# Patient Record
(demographics unavailable — no encounter records)

---

## 2025-02-10 NOTE — HISTORY OF PRESENT ILLNESS
[FreeTextEntry8] : 31M w/ PMH of GERD is coming in for follow up after flu.  Was diagnosed with flu on 1/12/24. Slightly improved after 2 weeks, but then got worse. Now fatigued, cough w/ green phlegm. Denies SOB or current fevers.

## 2025-05-27 NOTE — HISTORY OF PRESENT ILLNESS
[de-identified] : Mr. JARROD LOBO 31 year  male  with no PMH   present to the office due to a cough. As per patient for the past 7 days has a cough with green phlegm production with some wheezing.  Denies throat pain, earache,  nasal congestion.  Patient went to an UC 3 days ago, got a rx for a moxifloxacin 400mg for 7 days. Today took the 3rd dose. As per patient feels OK, still has a cough with wheezing. Wants to do a blood test to check for a hep B, C and HIV

## 2025-05-27 NOTE — PHYSICAL EXAM
[TextEntry] : Constitutional: Well appearing, not in acute distress Head: Normocephalic, no lesions Eyes: PERRLA, conjunctiva is NL Ear: Ear canal is normal, tympanic membrane is intact Nose: Nasal turbinates are NL Throat: Clear, no exudates, no lesions Neck: Supple, no masses Chest: Has  a scattered b/l wheezing Heart: Regular rate, no murmurs Abdomen: Soft, no tenderness : No CVAT Neuro: AAO x 3, no focal neurological deficit.

## 2025-05-27 NOTE — PLAN
[FreeTextEntry1] : Mr. JARROD LOBO 31 year  male  with no PMH   present to the office due to a cough. As per patient for the past 7 days has a cough with green phlegm production with some wheezing. Recommend  to take medications as it is prescribed. If symptoms will persist RTC. Rx were issued. Do a cxr.  Blood test to check for a Hep B, C and HIV will be done today(will call patient with the results) RTC for a f/u if symptoms will persist. Patient is verbalized understanding

## 2025-06-12 NOTE — ASSESSMENT
[FreeTextEntry1] : 31M w/ GERD is coming in for cough.  #Cough -Likely lingering cough after infection. Patient already received course of fluoroquinolone as well as Medrol dose pack. Chest Xray negative, lungs clear on today's exam. Patient feels better w/ the exception of cough. -Cough will likely resolve with time, recommend Mucinex OTC, benzonatate as needed. Will hold off on further abx -Instructed patient to let us know if his symptoms worsen or do not resolve after a few more weeks  RTC if no improvement

## 2025-06-12 NOTE — HISTORY OF PRESENT ILLNESS
[FreeTextEntry8] : 31M w/ GERD is coming in for cough.  Went to UC on 5/25, given Moxifloxacin for presumed PNA, on 5/27 came in, got benzonatate, Medrol dose pack, finished 2 days ago.   Now has cough w/ green phlegm. No fevers or other new symptoms.   Had negative Chest Xray on 5/28/25

## 2025-06-12 NOTE — PHYSICAL EXAM
[No Respiratory Distress] : no respiratory distress  [No Accessory Muscle Use] : no accessory muscle use [Clear to Auscultation] : lungs were clear to auscultation bilaterally [Normal Rate] : normal rate  [Regular Rhythm] : with a regular rhythm [No Focal Deficits] : no focal deficits [Normal Gait] : normal gait [Normal] : affect was normal and insight and judgment were intact

## 2025-07-22 NOTE — HISTORY OF PRESENT ILLNESS
[FreeTextEntry1] : 32M w/ GERD is coming in for CPE. [de-identified] : 32M w/ GERD is coming in for CPE.  Has been off PPI for 2 weeks, GERD currently well controlled. Not needing PPI, working on dietary modification.   Otherwise doing well, no acute complaints

## 2025-07-22 NOTE — HEALTH RISK ASSESSMENT
[Yes] : Yes [2 - 4 times a month (2 pts)] : 2-4 times a month (2 points) [1 or 2 (0 pts)] : 1 or 2 (0 points) [Never (0 pts)] : Never (0 points) [No] : In the past 12 months have you used drugs other than those required for medical reasons? No [0] : 2) Feeling down, depressed, or hopeless: Not at all (0) [PHQ-2 Negative - No further assessment needed] : PHQ-2 Negative - No further assessment needed [Never] : Never [Audit-CScore] : 2 [[id number]] : 0

## 2025-07-22 NOTE — ASSESSMENT
[FreeTextEntry1] : 32M w/ GERD is coming in for CPE.  #GERD -Encouraged lifestyle modification -Monitor off meds, currently well controlled  #LV trabeculations  -Noted on recent TTE -Cards consult for further workup   #HCM -Labs as below -Recommended annual skin exam by Derm -Recommended dentist every 6 months -Recommended healthy diet and exercise   RTC in 1 year or sooner if clinically indicated  [Vaccines Reviewed] : Immunizations reviewed today. Please see immunization details in the vaccine log within the immunization flowsheet.